# Patient Record
(demographics unavailable — no encounter records)

---

## 2025-05-19 NOTE — HEALTH RISK ASSESSMENT
[1 or 2 (0 pts)] : 1 or 2 (0 points) [Never (0 pts)] : Never (0 points) [Little interest or pleasure doing things] : 1) Little interest or pleasure doing things [Feeling down, depressed, or hopeless] : 2) Feeling down, depressed, or hopeless [2] : 2) Feeling down, depressed, or hopeless for more than half of the days (2) [Nearly Every Day (3)] : 4.) Feeling tired or having little energy? Nearly every day [1/2 of Days or More (2)] : 6.) Feeling bad about yourself, or that you are a failure, or have let yourself or your family down? Half the days or more [Not at All (0)] : 9.) Thoughts that you would be off dead or of hurting yourself in some way? Not at all [Moderate] : Severity of Depression is Moderate [Somewhat Difficult] : How difficult have these problems made it for you to do your work, take care of things at home, or get along with people? Somewhat difficult [[id number]] : 11

## 2025-05-19 NOTE — HISTORY OF PRESENT ILLNESS
[Definite:  ___ (Date)] : the last menstrual period was [unfilled] [FreeTextEntry1] : ISELA LOPEZ is a 16 year old, transmale seen on 05/19/2025 for intial transgender care program intake visit.  Preferred Pronouns:   he/him Sexual orientation: open/pansexual/bisexual Gender identity: transmale Assigned female at birth Specific Terms for Body Parts:  toip/boitom  Call pt: Isela Weiss is a 16-year-old trans male, he/him pronouns, he wants to start Testosterone, dad is supportive and relays having full legal decision making.  Guardian(s): Jasen Leach [phone] father; Parents are  (not legally ).  Pt sees mom on the weekends. MOm is supportive of pts gender identity as well as dad.    Parents  about in 4th grade (2018 approx)  COVID Screening: Vaccinated, 1 shot.  Transition HX + Present Life: Isela relays that it was scary at first, realized at 12-13 years old but felt confused about identity, talked a lot about it to friends and sister about it, got more confident in identity at 14-15 years old, came out to family last summer, is in process of coming out to extended family, has a family wedding to attend next week. Not sure what he's wearing yet. Lives with dad, sister, aunt, and cousin, feels safe at home, food secure.  Education | Employment: Attends [employer]  10th grade, out and supportive.  Out at school as a transguy.  Friends and school is supportive.  Involved in [employer] sometimes. Grades: A's and B's.  After HS plans to do photography. NOt sure what to do.   Mental Health: Has anxiety and depression, sees therapist Peyton, takes Lexapro managed by Dr. Paredes. No inpatient, mom side has schizophrenia, no hx of violence. Psychiatry: Dr. Jono Paredes @ [facility], [address] [place] [address] Started Lexapro 10 mg 1 week ago.  Was on Sertraline 150 mg before that due to anxiety.  Psychology:  Sees justin once a week . [facility]  - usually in person, somtimes virutal . History of mental health admission: none History of Suicidal/homicidal ideation & Self harm:  Denies suicidal ideation, denies homicidal ideation.   Endocrinology, Primary Care, GYN: Pediatrician is Dr. Tory Kam with [facility].  Last visit about 2 months ago. No hx of endo doors.  Menarche: age 11 yaers old.  LMP: 5/1/2025  Coping: Likes listening to music, talking to friends, photography (wildlife), time with cat.  Feelings of Gender Dysphoria/ Body Dysmorphia: Relays feeling out of place in body at times, looks so different from how he wants to present as. "Being in my body feels wrong".  Feels like they are inferior to cis-gender boys, and they are more masucline.  Wants to be more masuline.   Looking for deeper voice, facial hair when older.  Would like to grow taller.  Wants to be more masculine.    Gender Presentation: Binds with a [employer] binder, wears 5-6 hours a day, no pain.  Tattoos/ Piercing: Ears pierced, professionally done.  Cigarette, Vaping, Marijuana, Alcohol, and Drug Use: Denies any use.  Reproductive Endocrinology: Not sure yet.  Haven't conisdered it much yet.   Surgery: Not sure yet.  Name Change + Gender Marker: In the future.  goes by [employer] at school .   Nutrition: No concerns, eats 3 meals a day, exercises by walking a lot and going to gym occasionally. 5'0" 110l;bs  Sexual Health: Not in a relationship  Last HIV test:  none Last STI tests and sites  none:  Goals of Trans care: 1) Peds endo referral 2) Mental health clearnce